# Patient Record
Sex: MALE | Race: WHITE | NOT HISPANIC OR LATINO | Employment: FULL TIME | URBAN - METROPOLITAN AREA
[De-identification: names, ages, dates, MRNs, and addresses within clinical notes are randomized per-mention and may not be internally consistent; named-entity substitution may affect disease eponyms.]

---

## 2022-01-24 ENCOUNTER — HOSPITAL ENCOUNTER (EMERGENCY)
Facility: CLINIC | Age: 27
Discharge: HOME | End: 2022-01-24
Admitting: PHYSICIAN ASSISTANT
Payer: COMMERCIAL

## 2022-01-24 VITALS
RESPIRATION RATE: 20 BRPM | BODY MASS INDEX: 20.99 KG/M2 | TEMPERATURE: 97.7 F | HEIGHT: 72 IN | WEIGHT: 155 LBS | DIASTOLIC BLOOD PRESSURE: 85 MMHG | HEART RATE: 82 BPM | OXYGEN SATURATION: 97 % | SYSTOLIC BLOOD PRESSURE: 129 MMHG

## 2022-01-24 DIAGNOSIS — R04.0 EPISTAXIS: ICD-10-CM

## 2022-01-24 PROCEDURE — 250N000013 HC RX MED GY IP 250 OP 250 PS 637: Performed by: PHYSICIAN ASSISTANT

## 2022-01-24 PROCEDURE — 99283 EMERGENCY DEPT VISIT LOW MDM: CPT

## 2022-01-24 RX ADMIN — PHENYLEPHRINE HYDROCHLORIDE 1 DROP: 0.5 SPRAY NASAL at 17:43

## 2022-01-24 ASSESSMENT — MIFFLIN-ST. JEOR: SCORE: 1721.08

## 2022-01-24 NOTE — DISCHARGE INSTRUCTIONS
If you have recurrence of bleeding from the left nare please apply two squirts of the Josh-Synephrine to the nare followed by direct pressure with a clip.  Give this 20 to 30 minutes and I suspect that this will stop as it did here in the ER.

## 2022-01-24 NOTE — ED TRIAGE NOTES
Arrives to ED with c/o epistaxis that began 2 hours PTA. Denies trauma. Denies anticoagulant use. Blood clot noted to L nare. Nasal clamp applied in triage. Denies lightheadedness and dizziness.

## 2022-01-24 NOTE — ED NOTES
This nurse only discharged patient and retook vitals.    No questions when leaving.    Patient ambulated out of the emergency department without incident.

## 2022-01-24 NOTE — ED PROVIDER NOTES
EMERGENCY DEPARTMENT ENCOUNTER      NAME: Junito Salomon  AGE: 26 year old male  YOB: 1995  MRN: [id number]  EVALUATION DATE & TIME: 1/24/2022  5:28 PM    PCP: No Ref-Primary, Physician    ED PROVIDER: Moy Forbes PA-C      Chief Complaint   Patient presents with     Epistaxis         FINAL IMPRESSION:  1. Epistaxis          MEDICAL DECISION MAKING:    Pertinent Labs & Imaging studies reviewed. (See chart for details)  26 year old male presents to the Emergency Department for evaluation of acute episode of nosebleed.    After obtaining history present illness, I was briefly able to examine the patient out in triage.  Nasal clamp/clip was applied upon arrival and has been in place for about 30 to 40 minutes.  I did remove this.  Bleeding is now controlled.  I was able to remove some of the clot in his left nasal passage and then I did also apply Michelle-Synephrine to the left nare x2.  At this point time with no acute bleeding in the posterior pharynx or coming from the naris I feel that it is safe to discharged home.  I did not feel that further emergent work-up was necessary.  Overall patient is comfortable with plan of care.        ED COURSE    I met with the patient, obtained history, performed an initial exam, and discussed options and plan for diagnostics and treatment here in the ED.    At the conclusion of the encounter I discussed the results of all of the tests and the disposition. The questions were answered. The patient or family acknowledged understanding and was agreeable with the care plan.     MEDICATIONS GIVEN IN THE EMERGENCY:  Medications   phenylephrine (MICHELLE-SYNEPHRINE) 0.5 % spray 1 drop (has no administration in time range)       NEW PRESCRIPTIONS STARTED AT TODAY'S ER VISIT  New Prescriptions    No medications on file            =================================================================    HPI    Patient information was obtained from:   Junito Salomon is a 26 year  old male who presents to this ED for evaluation of acute onset of left nosebleed.  Patient states that around 2:00 he excellently bumped his nose and bleeding started.  Is quite significant for long period of time at home and he was not able to stop this.  Therefore he presented to the ED.  He is not on a blood thinner.  No previous history of nosebleeds similar to this.  Nasal clamp applied in triage.          PAST MEDICAL HISTORY:  No past medical history on file.    PAST SURGICAL HISTORY:  No past surgical history on file.      CURRENT MEDICATIONS:      Current Facility-Administered Medications:      phenylephrine (MICHELLE-SYNEPHRINE) 0.5 % spray 1 drop, 1 drop, Both Nostrils, Once, Moy Forbes PA-C  No current outpatient medications on file.      ALLERGIES:  No Known Allergies    FAMILY HISTORY:  No family history on file.    SOCIAL HISTORY:   Social History     Socioeconomic History     Marital status:      Spouse name: Not on file     Number of children: Not on file     Years of education: Not on file     Highest education level: Not on file   Occupational History     Not on file   Tobacco Use     Smoking status: Not on file     Smokeless tobacco: Not on file   Substance and Sexual Activity     Alcohol use: Not on file     Drug use: Not on file     Sexual activity: Not on file   Other Topics Concern     Not on file   Social History Narrative     Not on file     Social Determinants of Health     Financial Resource Strain: Not on file   Food Insecurity: Not on file   Transportation Needs: Not on file   Physical Activity: Not on file   Stress: Not on file   Social Connections: Not on file   Intimate Partner Violence: Not on file   Housing Stability: Not on file       VITALS:  Patient Vitals for the past 24 hrs:   BP Temp Temp src Pulse Resp SpO2 Height Weight   01/24/22 1641 (!) 141/84 97.7  F (36.5  C) Temporal 82 20 97 % 1.829 m (6') 70.3 kg (155 lb)       PHYSICAL EXAM    Physical Exam  Vitals and  nursing note reviewed.   Constitutional:       General: He is not in acute distress.     Appearance: He is normal weight. He is not toxic-appearing.   HENT:      Head: Normocephalic.      Right Ear: External ear normal.      Left Ear: External ear normal.      Nose:      Comments: Examination of the left nare does reveal nasal clamp in place this was removed it did appear that there is a large clot sticking out.  I was able to remove this with direct manipulation with a Kleenex.  Extremely like clot removed.  After this was removed there is current bleeding noted.  I was able to apply Josh-Synephrine to the left nare, and overall hemostasis was achieved without any current direct pressure.  Again nasal clamp was placed upon arrival and had been in place for at least 30 to 40 minutes prior to my assessment.     Mouth/Throat:      Pharynx: Oropharynx is clear.      Comments: Posterior pharynx is normal, no bleeding in the posterior pharynx noted  Eyes:      Conjunctiva/sclera: Conjunctivae normal.   Pulmonary:      Effort: Pulmonary effort is normal. No respiratory distress.   Musculoskeletal:         General: Normal range of motion.   Skin:     General: Skin is warm and dry.   Neurological:      General: No focal deficit present.      Mental Status: He is alert. Mental status is at baseline.      Sensory: No sensory deficit.      Motor: No weakness.   Psychiatric:         Mood and Affect: Mood normal.          LAB:  All pertinent labs reviewed and interpreted.       RADIOLOGY:  Reviewed all pertinent imaging. Please see official radiology report.  No orders to display         PROCEDURES:   Application of Josh-Synephrine spray x2 in the left nare        Moy Forbes PA-C  Emergency Medicine  [facility]     Moy Forbes PA-C  01/24/22 5534

## 2022-04-03 ENCOUNTER — HEALTH MAINTENANCE LETTER (OUTPATIENT)
Age: 27
End: 2022-04-03

## 2022-10-03 ENCOUNTER — HEALTH MAINTENANCE LETTER (OUTPATIENT)
Age: 27
End: 2022-10-03

## 2023-05-20 ENCOUNTER — HEALTH MAINTENANCE LETTER (OUTPATIENT)
Age: 28
End: 2023-05-20

## 2024-07-28 ENCOUNTER — HEALTH MAINTENANCE LETTER (OUTPATIENT)
Age: 29
End: 2024-07-28